# Patient Record
Sex: MALE | Race: WHITE | NOT HISPANIC OR LATINO | URBAN - METROPOLITAN AREA
[De-identification: names, ages, dates, MRNs, and addresses within clinical notes are randomized per-mention and may not be internally consistent; named-entity substitution may affect disease eponyms.]

---

## 2024-08-21 ENCOUNTER — ATHLETIC TRAINING (OUTPATIENT)
Dept: SPORTS MEDICINE | Facility: OTHER | Age: 19
End: 2024-08-21

## 2024-08-21 DIAGNOSIS — M25.511 ACUTE PAIN OF RIGHT SHOULDER: ICD-10-CM

## 2024-08-21 DIAGNOSIS — M79.671 RIGHT FOOT PAIN: Primary | ICD-10-CM

## 2024-08-21 NOTE — PROGRESS NOTES
Athletic Training Daily Note    Name: Jayme Fonseca  Age: 18 y.o.     Visit Diagnosis: Right foot pain [M79.671] and Right Shoulder Pain    Subjective: Ath reports to [facility] for rehab. Ath foot hurt from kicking goal post.    Objective: Padded foot for practice. Given shoulder rehab exercises    Treatment Log:     Bodyblade, Prone 90/90 with weighted ball, Wall walks with blue TB, Stim through shoulder

## 2024-08-22 ENCOUNTER — ATHLETIC TRAINING (OUTPATIENT)
Dept: SPORTS MEDICINE | Facility: OTHER | Age: 19
End: 2024-08-22

## 2024-08-22 DIAGNOSIS — M25.511 ACUTE PAIN OF RIGHT SHOULDER: Primary | ICD-10-CM

## 2024-08-22 NOTE — PROGRESS NOTES
Athletic Training Daily Note    Name: Jayme Fonseca  Age: 18 y.o.     Visit Diagnosis: Acute pain of right shoulder [M25.511]    Subjective: Ath reports to [facility] for rehab.He reports decrease in pain in R shoulder, feeling ready to play in tonight's game.     Objective: No palpable soreness. Full AROM without pain or soreness.     Treatment Log:  Ath requested and received e-stim for 10min premod at 007 intensity for R shoulder pain. He also received padding for his 5th metatarsal that was feeling bruised.

## 2025-08-07 ENCOUNTER — APPOINTMENT (OUTPATIENT)
Dept: LAB | Age: 20
End: 2025-08-07
Attending: PHYSICIAN ASSISTANT